# Patient Record
(demographics unavailable — no encounter records)

---

## 2025-05-28 NOTE — ASSESSMENT
[Vaccines Reviewed] : Immunizations reviewed today. Please see immunization details in the vaccine log within the immunization flowsheet.  [FreeTextEntry1] : 69 yo F PMHx of HTN presents to establish care.  #Essential HTN - BP elevated to 148/83 today - Reports SBP at home 120-130 - C/w home log monitoring - C/w current meds Amlodipine 5mg QD and Valsartan-HCTZ 320-12.5mg QD - Pt hesitant to change regimen at this time  #HCM - Mammo referral given - [place] - refuses Colonoscopy - FIT test - Labs ordered today - BP log next visit - RTC in 3 months

## 2025-05-28 NOTE — INTERPRETER SERVICES
[Language Line ] : provided by [employer]   [Time Spent: ____ minutes] : Total time spent using  services: [unfilled] minutes. The patient's primary language is not English thus required  services. [Interpreters_IDNumber] : [id number] [Interpreters_FullName] : Kaitlin [TWNoteComboBox1] : [place]

## 2025-05-28 NOTE — HISTORY OF PRESENT ILLNESS
[FreeTextEntry1] : Establish care [de-identified] : 69 yo F PMHx of HTN presents to establish care. Amlodipine 5mg and valsartan -12.5mg. Recent hospital visit 03/2025 for elevated BP due to missing meds as she did not have refills. Reports SBP at home 120-130.

## 2025-05-28 NOTE — HISTORY OF PRESENT ILLNESS
[FreeTextEntry1] : Establish care [de-identified] : 71 yo F PMHx of HTN presents to establish care. Amlodipine 5mg and valsartan -12.5mg. Recent hospital visit 03/2025 for elevated BP due to missing meds as she did not have refills. Reports SBP at home 120-130.

## 2025-05-28 NOTE — PHYSICAL EXAM
[No Acute Distress] : no acute distress [Normal Rate] : normal rate  [Regular Rhythm] : with a regular rhythm [Normal S1, S2] : normal S1 and S2 [No Edema] : there was no peripheral edema [Soft] : abdomen soft [Non Tender] : non-tender [No Focal Deficits] : no focal deficits [Alert and Oriented x3] : oriented to person, place, and time

## 2025-05-28 NOTE — ASSESSMENT
[Vaccines Reviewed] : Immunizations reviewed today. Please see immunization details in the vaccine log within the immunization flowsheet.  [FreeTextEntry1] : 71 yo F PMHx of HTN presents to establish care.  #Essential HTN - BP elevated to 148/83 today - Reports SBP at home 120-130 - C/w home log monitoring - C/w current meds Amlodipine 5mg QD and Valsartan-HCTZ 320-12.5mg QD - Pt hesitant to change regimen at this time  #HCM - Mammo referral given - [place] - refuses Colonoscopy - FIT test - Labs ordered today - BP log next visit - RTC in 3 months